# Patient Record
(demographics unavailable — no encounter records)

---

## 2025-04-15 NOTE — HISTORY OF PRESENT ILLNESS
[FreeTextEntry1] : Patient here with her mother.  She has no significant past medical history.  She is morbidly obese with BMI of 39  Patient states she went to the emergency room on 4/13.  For 1 week prior to that she was waking up in the morning "gagging".  Unable to tolerate p.o.  This turned to vomiting in the morning.  She denies overt heartburn.  She states that on 4/13 she had severe epigastric pain and vomiting every 10 minutes.  Had some diarrhea on that day.  Now moving her bowels regularly.  In the emergency room her hemoglobin was 14, white count 12.3 platelets 473.  ALT 41.  No imaging was performed.  Patient states she is not pregnant.  She states she had similar episode about a year ago and had an upper endoscopy.  She never heard what the results are so she assumes those were normal.  She was discharged from the emergency room with a prescription of Zofran

## 2025-04-15 NOTE — ASSESSMENT
[FreeTextEntry1] : A/P Patient with epigastric pain, nausea vomiting Likely having bad reflux episode Patient was told to keep well-hydrated with plenty of water She will be given prescription for omeprazole 40 mg before breakfast and in the evening.  Pepcid 40 mg before sleep.  Prescription for Zofran.  She will take 1 Zofran before sleep and may take Zofran during the day as needed nausea.  EGD has been ordered  I discussed the risks and benefits of EGD and patient was given opportunity to ask questions. EGD to r/o Holloway's  esophagus, PUD, mass, AVM'S. Pt is medically optimized for EGD  Ultrasound has been ordered as well.  Patient was told to go to the emergency room if she has fever or if pain is intolerable or nausea and vomiting becomes severe again.  At that point she may need an inpatient EGD.  Can consider HIDA scan as her pain is described as twisting  Patient was accompanied by mother.  All questions answered.  Patient will see me in the office in 2 weeks

## 2025-04-17 NOTE — ASSESSMENT
[FreeTextEntry1] : A/P gerd, epigastric pain   I discussed the risks and benefits of EGD and patient was given opportunity to ask questions. EGD to r/o Holloway's  esophagus, PUD, mass, AVM'S. Pt is medically optimized for EGD

## 2025-04-24 NOTE — ASSESSMENT
[FreeTextEntry1] : A/P Patient with epigastric pain-she feels that this is worse on an empty stomach She was told to take small frequent meals. Continue omeprazole 40 mg twice daily, Pepcid 40 mg nightly and Zofran as needed If she gets epigastric pain she can take Maalox Mylanta Pepto-Bismol or Tums. We discussed GERD diet Today's instructions for acid reflux include avoid provocative foods. For example citrus alcohol coffee chocolate mints. Smaller meals, no eating 3 hours prior to bedtime and elevate head of the bed prior to sleep. Follow-up telephone visit in 2 weeks

## 2025-04-24 NOTE — PHYSICAL EXAM
[Alert] : alert [Normal Voice/Communication] : normal voice/communication [Healthy Appearing] : healthy appearing [No Respiratory Distress] : no respiratory distress [No Acc Muscle Use] : no accessory muscle use [Respiration, Rhythm And Depth] : normal respiratory rhythm and effort [Auscultation Breath Sounds / Voice Sounds] : lungs were clear to auscultation bilaterally [Heart Rate And Rhythm] : heart rate was normal and rhythm regular [Normal S1, S2] : normal S1 and S2 [No Masses] : no abdominal mass palpated [Abdomen Soft] : soft [Oriented To Time, Place, And Person] : oriented to person, place, and time [Bowel Sounds] : normal bowel sounds [de-identified] : She has some tenderness on deep palpation

## 2025-04-24 NOTE — HISTORY OF PRESENT ILLNESS
[FreeTextEntry1] : Patient states she went to the emergency room on 4/13. For 1 week prior to that she was waking up in the morning "gagging". Unable to tolerate p.o. This turned to vomiting in the morning. She denies overt heartburn. She states that on 4/13 she had severe epigastric pain and vomiting every 10 minutes. Had some diarrhea on that day. Now moving her bowels regularly. In the emergency room her hemoglobin was 14, white count 12.3 platelets 473. ALT 41. No imaging was performed. Patient states she is not pregnant. She states she had similar episode about a year ago and had an upper endoscopy. She never heard what the results are so she assumes those were normal. She was discharged from the emergency room with a prescription of Zofran.  Epigastric pain seems to be worse on a empty stomach  At her last visit  4/17 she was given prescription of omeprazole 40 mg twice daily, famotidine 40 mg nightly and Zofran as needed  Patient had EGD on 4/17.  EGD was normal.  Biopsies of stomach were positive for gastritis, negative for H. pylori.  At the time of the endoscopy she states she was still having some mild epigastric pain but pain was improved  On 4/19 she was experiencing epigastric pain again.  She went to [place] emergency room.  Labs reviewed.  CBC CMP normal.  Ultrasound and CT results were reviewed on her phone.  CT was negative for acute pathology.  Ultrasound showed fatty liver, no gallstone.  She was sent home on Zegerid and Carafate.  She felt that Carafate gave her nausea

## 2025-05-01 NOTE — HISTORY OF PRESENT ILLNESS
[Currently Active] : currently active [LMP unknown] : LMP unknown [N] : Patient does not use contraception [unknown] : Patient is unsure of the date of her LMP [Menarche Age: ____] : age at menarche was [unfilled] [HIV Test offered] : HIV Test offered [Syphilis test offered] : Syphilis test offered [Gonorrhea test offered] : Gonorrhea test offered [Chlamydia test offered] : Chlamydia test offered [Trichomonas test offered] : Trichomonas test offered [Hepatitis B test offered] : Hepatitis B test offered [Hepatitis C test offered] : Hepatitis C test offered [Y] : Patient reports abnormal menses [Men] : men [Vaginal] : vaginal [No] : No [Patient would like to be screened for STIs] : Patient would like to be screened for STIs [FreeTextEntry1] : 22-year-old F hx PCOS/irregulra menses LMP unknown presenting for annual GYN visit desiring to discuss her menstrual irregularities. Menarche age 9, menses lifelong irregular since age 10, previously following with GYN and endocrinology, currently no menses for about five months. C/o vaginal odor. ROS otherwise negative. [TextBox_4] : Reports she completed the HPV vaccine series. [TextBox_19] : Not indicated at this time. [TextBox_25] : Not indicated at this time. [TextBox_31] : Has never had before. [TextBox_37] : Not indicated at this time. [TextBox_43] : Not indicated at this time. [PGHxTotal] : 0

## 2025-05-01 NOTE — PHYSICAL EXAM
[Chaperoned Physical Exam] : A chaperone was present in the examining room during all aspects of the physical examination. [MA] : MA [Appropriately responsive] : appropriately responsive [Alert] : alert [No Acute Distress] : no acute distress [Regular Rate Rhythm] : regular rate rhythm [Soft] : soft [Non-tender] : non-tender [Non-distended] : non-distended [No HSM] : No HSM [No Lesions] : no lesions [No Mass] : no mass [Oriented x3] : oriented x3 [Examination Of The Breasts] : a normal appearance [No Masses] : no breast masses were palpable [Labia Majora] : normal [Labia Minora] : normal [Normal] : normal [Uterine Adnexae] : normal [FreeTextEntry2] : An Estrada [FreeTextEntry5] : Breathing comfortably on room air

## 2025-05-01 NOTE — DISCUSSION/SUMMARY
[FreeTextEntry1] : # Health Maintenance - Cervical cancer screening: Pap smear obtained. - Breast cancer screening: Not indicated at this time. - Colon cancer screening: Not indicated at this time. - Osteoporosis screening: Not indicated at this time. - STI screening: Bloodwork obtained today - HPV vaccine: Completed course per patient  # PCOS/oligoanovulation # Secondary amenorrhea: negaitve UPT - Prior evaluation reviewed: Elevated prolactin, insulin, testosterone. PUS unremarkable. Pituitary cyst. Txn w/ Provera and norethindrone.  - Extensive discussion on management with diet/exercise - Discussed importance of endometria protection - Plan for Provera withdrawal bleed and then desires to start POPs. Will consider hormonal IUD  # Obesity # Fatty liver # Hx hyperlipidemia  - Extensive discussion on management with diet (focus on increasing protein/fiber to feel newberry/longer while decreasing carbohydrates) and exercise (5x/week 30 min mod cario + 2x/week strength training) - Referred to [facility]/Bariatric   # Hx pituitary cyst  - MRI brain/pituitary ordered for follow up   # Vaginal odor - Vaginitis panel pending  RTO two months/PCOS + contraception follow up

## 2025-05-12 NOTE — PHYSICAL EXAM
[Obese, well nourished, in no acute distress] : obese, well nourished, in no acute distress [Normal] : affect appropriate [de-identified] : Anicteric, no conjunctival injection or drainage noted [de-identified] : Supple, no obvious palpable masses or lymphadenopathy noted [de-identified] : Nonlabored respirations, equal chest rise, no audible wheezing [de-identified] : Regular rate and rhythm [de-identified] : Soft, obese, nontender. [de-identified] : No obvious deformity, normal gait

## 2025-05-12 NOTE — ASSESSMENT
[FreeTextEntry1] : YARED AHN is a 22 year-old F with a long-standing h/o obesity, who presents today for initial evaluation for weight management options.   Had a lengthy discussion with the patient regarding nutrition, exercise, weight loss medications, and bariatric surgery. The pt would like to think about her weight loss options, though leaning towards surgery.   Health maintenance and behavioral/nutrition counseling for obesity: An additional 30 minutes of the visit was spent counseling the patient regarding need for aggressive weight loss and behavior modification. Patient educated regarding proper nutrition, and how to modify current eating habits noted in HPI. Patient also educated regarding exercise including suggestions of different exercises to try. All questions answered.   Patient will work on the following:  -Eliminate snacks  -Focus on eating 3 well-balanced meals during the daytime with appropriate portion size  -Cooking fresh meals rather than take out/processed/ready-made foods  -Protein focused meals, plenty of vegetables and complex carbohydrates  -Avoiding fried/fatty foods and foods containing high sugar content  -Incorporating exercise (aim for 150 mins/week with both cardio- and strength-training)   Patient will call when she is interested in following up after further reviewing/thinking about various weight loss options Patient provided educational material regarding bariatric surgery as she is most interested in surgery  All questions answered  Additional time spent before and after visit reviewing chart.

## 2025-05-12 NOTE — REVIEW OF SYSTEMS
[Recent Change In Weight] : ~T recent weight change [Abdominal Pain] : abdominal pain [Reflux/Heartburn] : reflux/heartburn [Negative] : Allergic/Immunologic

## 2025-05-12 NOTE — HISTORY OF PRESENT ILLNESS
[de-identified] : YARED AHN is a 22 year old F with a long-standing Hx of obesity presents today for initial evaluation for weight management options.    Heaviest/current wt 230/208 lbs, lowest wt 160 lbs. Goal weight: 170 lbs Diets/exercise programs tried in the past: yes Weight loss medications tried in the past: no Reason for stopping weight loss medication if applicable: n/a    History of bariatric surgery: no Obesity comorbidities: preDM, HLD, NAFLD, PCOS Comorbidities improved/resolved: n/a Anti-obesity medication: none Obesity medication side effects: n/a   Reports no personal or family history of medullary thyroid cancer or MEN syndrome Reports no personal history of glaucoma, pancreatitis, gallstones, kidney stones, eating disorders, anxiety, seizures, current Wellbutrin use, uncontrolled blood pressure, current/recent narcotic or suboxones use. Patient notes pituitary mass dx 2021 - planned for MRI Patient also has GERD requiring recent hospitalization for 3 weeks lost weight due to abdominal pain and nausea/vomiting at the time. Underwent EGD reportedly normal. Now with resolution of abdominal pain, nausea/vomiting.  Has regained some weight since discharge due to being able to eat/drink again without nausea/vomiting. Still feels she is regaining weight after discharge. Notes some anxiety but no panic attacks, does not require any medications Reports no significant nausea/vomiting, abdominal pain, constipation/diarrhea on a regular basis.   Health screenings: Last Colonoscopy: n/a Last Mammogram: n/a Last Pap Smear: recent   Females of Childbearing Age: Plans for future pregnancy: unsure If yes, when: unsure Form(s) of Contraception: OCP Currently breastfeeding: no  Current dietary lifestyle: Breakfast: scrambled eggs, oatmeal , fruits, breakfast sandwich, smoothies Lunch: meats from deli, salads , etc sometimes takes from home Dinner: fruits, smoothies, plantains, meats, salad Snacks: fruits Drinks: water, juice, soda   Activity Lifestyle: Sleep: 8-9hrs Physical activity/exercise: 15mins a day cardio at the gym Work: [employer] Smoking/ETOH: current smoker marijuana,

## 2025-06-18 NOTE — ASSESSMENT
[FreeTextEntry1] : Jessie Brown is a 22-year-old female with past medical history of morbid obesity, prediabetes, HLD, NAFLD, PCOS here to further discuss bariatric surgery.  Had a lengthy discussion with the patient regarding nutrition, exercise, and bariatric surgery. Reviewed surgical options such as the Vance-en-Y bypass and Sleeve Gastrectomy, and the risks and benefits of each. Discussed how bariatric surgery may offer greater weight loss results with better long-term success. The following risks of bariatric surgery were discussed with the patient using diagrams. All questions answered.   Complications were discussed including but not limited to: vitamin and protein deficiencies, pneumonia, urinary infection, wound infection, leaks/peritonitis possibly requiring intraabdominal drains or reoperation, bleeding, DVT, pulmonary embolus, severe reflux, sleeve obstruction, anastomotic stricture, anastomotic ulcer, abdominal wall hernias, gallstone formation, revisions, death, inadequate weight loss. The importance of vitamins and protein supplementation was stressed, as was the importance of follow-up and exercise.   Health maintenance and behavioral/nutrition counseling for obesity: An additional 30 minutes of the visit was spent counseling the patient regarding need for aggressive weight loss and behavior modification. Patient educated regarding proper nutrition, and how to modify current eating habits noted in HPI. Patient also educated regarding exercise including suggestions of different exercises to try. All questions answered.   Patient will work on the following:  -Eliminate snacks  -Focus on eating 3 well-balanced meals during the daytime with appropriate portion size  -Cooking fresh meals rather than take out/processed/ready-made foods  -Protein focused meals, plenty of vegetables and complex carbohydrates  -Avoiding fried/fatty foods and foods containing high sugar content  -Incorporating exercise (aim for 150 mins/week with both cardio- and strength-training)   Will begin workup and clearances in planning for bariatric surgery -make healthier food choices as educated in office today and meet with nutritionist -start or continue an exercise program -nutrition evaluation and clearance -psychology evaluation and clearance -cardiology evaluation and clearance -pulmonology evaluation and clearance -GI for upper endoscopy -labs/vitamin levels   Pt verbalized understanding of the above. Patient educated to call with questions/concerns All questions answered Follow up in 1-2 months   Additional time spent before and after visit reviewing chart.

## 2025-06-18 NOTE — PHYSICAL EXAM
[Obese, well nourished, in no acute distress] : obese, well nourished, in no acute distress [Normal] : affect appropriate [de-identified] : Anicteric, no conjunctival injection or drainage noted [de-identified] : Supple, no obvious palpable masses or lymphadenopathy noted [de-identified] : Nonlabored respirations, equal chest rise, no audible wheezing [de-identified] : Regular rate and rhythm [de-identified] : Soft, obese, nontender. [de-identified] : No obvious deformity, normal gait

## 2025-06-18 NOTE — HISTORY OF PRESENT ILLNESS
[de-identified] : 22-year-old female with morbid obesity, complicated by prediabetes, HLD, NAFLD, PCOS here for follow-up of weight management At last visit I had discussed nutrition, exercise, weight loss medications, and bariatric surgery with the patient.  She is most interested in bariatric surgery and would like to learn more.  She has reviewed the binder and various materials that were provided to her at last visit. Overall patient is feeling well and trying to make healthier food choices.  She has also started an exercise program.

## 2025-07-01 NOTE — HISTORY OF PRESENT ILLNESS
[Preoperative Visit] : for a medical evaluation prior to surgery [Scheduled Procedure ___] : a [unfilled] [Date of Surgery ___] : on [unfilled] [Surgeon Name ___] : surgeon: [unfilled] [Fever] : no fever [Chills] : no chills [Fatigue] : no fatigue [Chest Pain] : no chest pain [Dyspnea] : no dyspnea [Dysuria] : no dysuria [Urinary Frequency] : no urinary frequency [Nausea] : no nausea [Vomiting] : no vomiting [Diarrhea] : no diarrhea [Abdominal Pain] : no abdominal pain [Easy Bruising] : no easy bruising [Lower Extremity Swelling] : no lower extremity swelling [Poor Exercise Tolerance] : no poor exercise tolerance [Diabetes] : no diabetes [Cardiovascular Disease] : no cardiovascular disease [Pulmonary Disease] : no pulmonary disease [Anti-Platelet Agents] : no anti-platelet agents [Nicotine Dependence] : no nicotine dependence [Alcohol Use] : no  alcohol use [Renal Disease] : no renal disease [GI Disease] : no gastrointestinal disease [Sleep Apnea] : no sleep apnea [Thromboembolic Problems] : no thromboembolic problems [Frequent use of NSAIDs] : no use of NSAIDs [Impaired Immunity] : no impaired immunity [Steroid Use in Last 6 Months] : no steroid use in the last six months [Frequent Aspirin Use] : no frequent aspirin use [Prior Anesthesia] : Prior anesthesia [Prev Anesthesia Reaction] : no previous anesthesia reaction [Electrocardiogram] : ~T an ECG ~C was performed [Metabolic Capacity ___Mets%] : The patient has a metabolic capacity of [unfilled] Mets%  [Good] : Good [Anesthesia Reaction] : no anesthesia reaction [Sudden Death] : no sudden death [Clotting Disorder] : no clotting disorder [Bleeding Disorder] : no bleeding disorder [de-identified] : [facility] [de-identified] : walks 10 -15 mins no problemt [FreeTextEntry1] : reason :  Pre-operative cardiovascular risk evaluation and management   HPI for today: 7 1 2025:  This is a 22 year old  pre diabetes patient who is here for Pre-operative cardiovascular risk evaluation and management for bariatric surgery  no chest pain . no dyspnea on exertion no dizizness n0 syncope    No smoking. No alcohol. No drugs.     Family history: Father:  no myocardial infarction. no Cerebro vascular accident Mother :  no myocardial infarction. No Cerebro vascular accident Siblings:  No myocardial infarction.  No CVA

## 2025-07-01 NOTE — CARDIOLOGY SUMMARY
[de-identified] : 7 1 2025:  Sinus Rhythm  Low voltage in precordial leads. ABNORMAL    Sinus Rhythm  Low voltage in precordial leads. ABNORMAL

## 2025-07-01 NOTE — DISCUSSION/SUMMARY
[Optimized for Surgery] : the patient is optimized for surgery [As per surgery] : as per surgery [Continue] : Continue medications as currently directed [Patient] : the patient [Risks] : risks [Benefits] : benefits [Alternatives] : alternatives [With Me] : with me [FreeTextEntry1] :  This is a 22 year old  pre diabetes patient who is here for Pre-operative cardiovascular risk evaluation and management for bariatric surgery   1) Pre-operative cardiovascular risk evaluation and management  : bariatric surgery exercise stress test . and transthoracic echocardiogram .  if no significant ischemia then proceed for surgery  [EKG obtained to assist in diagnosis and management of assessed problem(s)] : EKG obtained to assist in diagnosis and management of assessed problem(s)

## 2025-07-14 NOTE — ASSESSMENT
[FreeTextEntry1] : Jessie Brown is a 22-year-old female with past medical history of morbid obesity, prediabetes, HLD, NAFLD, PCOS currently undergoing workup and clearances and planning for bariatric surgery  Continue workup and clearances in planning for bariatric surgery -make healthier food choices  - continue exercise program -nutrition evaluation and clearance -psychology evaluation and clearance -cardiology evaluation and clearance -pulmonology evaluation and clearance -GI for upper endoscopy -labs/vitamin levels   Pt verbalized understanding of the above. Patient educated to call with questions/concerns All questions answered Follow up in 1-2 months   Additional time spent before and after visit reviewing chart.

## 2025-07-14 NOTE — HISTORY OF PRESENT ILLNESS
[Home] : at home, [unfilled] , at the time of the visit. [Medical Office: ([facility])___] : at the medical office located in  [Telehealth (audio & video)] : This visit was provided via telehealth using real-time 2-way audio visual technology. [Verbal consent obtained from patient] : the patient, [unfilled] [de-identified] : 22-year-old female with morbid obesity, complicated by prediabetes, HLD, NAFLD, PCOS here for follow-up of weight management Overall patient is feeling well and trying to make healthier food choices. Patient is eating more protein, drinking smoothies, avoiding snack foods. She has also started an exercise program - 30mins cardio, and strength training x 3-4 days a week. Patient has all her appointments for clearances set up.

## 2025-07-14 NOTE — PHYSICAL EXAM
[Obese, well nourished, in no acute distress] : obese, well nourished, in no acute distress [Normal] : affect appropriate [de-identified] : Limited due to telehealth [de-identified] : Anicteric, no conjunctival injection or drainage noted

## 2025-07-29 NOTE — CURRENT PSYCHIATRIC SYMPTOMS
[Depressed Mood] : no depressed mood [Decreased Concentration] : no decrease in concentrating ability [Insomnia] : no insomnia disorder [Euphoria] : no euphoria [Dec Need For Sleep] : no decreased need for sleep [Thought Disorder] : ~T a thought disorder was not noted [Excessive Worry] : no excessive worries [Panic] : no panic disorder [de-identified] : situational anxiety in reaction to work stressors [FreeTextEntry1] : Denies all psyc tx hx.

## 2025-07-29 NOTE — SOCIAL HISTORY
[Never ] : never  [FreeTextEntry1] : resides with her mother [FreeTextEntry2] : [employer] at a [employer] [FreeTextEntry3] : no children [FreeTextEntry4] : some college [FreeTextEntry5] : F passing of liver cancer in hospital in 2021 - denies PTSD sxs

## 2025-07-29 NOTE — HISTORY OF PRESENT ILLNESS
[Genetics] : genetics [Large Portions] : large portions [Emotional Eating] : emotional eating [Mindless Eating] : mindless eating [Poor Choices] : poor choices [de-identified] : Pt's motivation for surgery is to improve her overall health, avoid medical sequelae of obesity and improve her QOL. Per pt, her highest weight was 220 lbs x 4 mths ago and her lowest weight was 165 lbs in her late teens. Her stated goal weight is 150 lbs and she expresses intent to make behavioral and dietary changes towards obtaining optimal results. [de-identified] : sleeve gastrectomy  [de-identified] : 5 yrs:  speaking with others who have had bariatric surgery; online reading; discussions with surgeon; will be attending nutrition education classes; and reading educational materials provided by surgeon [de-identified] : PCOS [de-identified] : none.  Pt denies ED hx and bxs, including binge eating.  [de-identified] : A current typical day of eating is reported as follows: B:  eggs w/fruit or a protein shake S:  fruit L:  take out [employer], [employer], deli (chicken/steak and rice) D:  L leftovers or homemade soup/stew Drinks water and occasionally soda or juice.  [de-identified] : Keto Diet, counting calories (MFP), meal replacement shakes,  and going to the gym.  Despite multiple attempts at diet and exercise modifications, patient reports inability to achieve significant weight loss.

## 2025-07-29 NOTE — REASON FOR VISIT
[Home] : at home, [unfilled] , at the time of the visit. [Other Location: e.g. Home (Enter Location, City,State)___] : at [unfilled] [Telehealth (audio & video)] : This visit was provided via telehealth using real-time 2-way audio visual technology. [Verbal consent obtained from patient] : the patient, [unfilled] [Initial Consult] : an initial consult for [Morbid Obesity (BMI>40)] : morbid obesity (bmi>40) [Referring By:  ___] : ~[address]~ was referred for psychological evaluation by Dr. FERGUSON [Attempted Weight Loss] : attempted weight loss [Commitment to Modified Lifestyle] : commitment to a modified lifestyle pre and post surgery [Difficulties with Diet Compliance] : difficulties with diet compliance  [Expectations of Outcome] : expectations of outcome [Motivation for Selecting Surgery] : motivation for selecting surgery [Strength of Social Support System] : strength of social support system [Patient Understands Data May be Shared] : patient understands that the information discussed during the evaluation would be shared with referring provider and possibly with ~his/her~ insurance provider [de-identified] : for evaluation of psychological appropriateness for bariatric surgery  [de-identified] : Confidentiality and its limitations were explained.